# Patient Record
Sex: FEMALE | Race: WHITE | NOT HISPANIC OR LATINO
[De-identification: names, ages, dates, MRNs, and addresses within clinical notes are randomized per-mention and may not be internally consistent; named-entity substitution may affect disease eponyms.]

---

## 2017-03-10 ENCOUNTER — APPOINTMENT (OUTPATIENT)
Dept: OBGYN | Facility: CLINIC | Age: 42
End: 2017-03-10

## 2017-12-12 ENCOUNTER — APPOINTMENT (OUTPATIENT)
Dept: OBGYN | Facility: CLINIC | Age: 42
End: 2017-12-12

## 2018-02-23 ENCOUNTER — APPOINTMENT (OUTPATIENT)
Dept: OBGYN | Facility: CLINIC | Age: 43
End: 2018-02-23
Payer: COMMERCIAL

## 2018-02-23 ENCOUNTER — ASOB RESULT (OUTPATIENT)
Age: 43
End: 2018-02-23

## 2018-02-23 PROCEDURE — 76830 TRANSVAGINAL US NON-OB: CPT

## 2018-06-29 ENCOUNTER — APPOINTMENT (OUTPATIENT)
Dept: OBGYN | Facility: CLINIC | Age: 43
End: 2018-06-29
Payer: COMMERCIAL

## 2018-06-29 VITALS
DIASTOLIC BLOOD PRESSURE: 72 MMHG | HEIGHT: 64 IN | SYSTOLIC BLOOD PRESSURE: 110 MMHG | BODY MASS INDEX: 24.92 KG/M2 | OXYGEN SATURATION: 99 % | HEART RATE: 87 BPM | WEIGHT: 146 LBS

## 2018-06-29 DIAGNOSIS — Z12.31 ENCOUNTER FOR SCREENING MAMMOGRAM FOR MALIGNANT NEOPLASM OF BREAST: ICD-10-CM

## 2018-06-29 PROCEDURE — 99396 PREV VISIT EST AGE 40-64: CPT

## 2018-06-29 RX ORDER — DIAZEPAM 5 MG/1
5 TABLET ORAL
Qty: 30 | Refills: 0 | Status: COMPLETED | COMMUNITY
Start: 2018-04-05

## 2018-06-29 RX ORDER — METHYLPREDNISOLONE 4 MG/1
4 TABLET ORAL
Qty: 21 | Refills: 0 | Status: COMPLETED | COMMUNITY
Start: 2018-04-05

## 2018-06-29 RX ORDER — OXYCODONE AND ACETAMINOPHEN 5; 325 MG/1; MG/1
5-325 TABLET ORAL
Qty: 80 | Refills: 0 | Status: COMPLETED | COMMUNITY
Start: 2018-04-05

## 2018-06-29 RX ORDER — CEPHALEXIN 500 MG/1
500 CAPSULE ORAL
Qty: 40 | Refills: 0 | Status: COMPLETED | COMMUNITY
Start: 2018-04-05

## 2018-07-01 LAB — HPV HIGH+LOW RISK DNA PNL CVX: NOT DETECTED

## 2018-07-05 LAB — CYTOLOGY CVX/VAG DOC THIN PREP: NORMAL

## 2018-09-27 ENCOUNTER — ASOB RESULT (OUTPATIENT)
Age: 43
End: 2018-09-27

## 2018-09-27 ENCOUNTER — APPOINTMENT (OUTPATIENT)
Dept: OBGYN | Facility: CLINIC | Age: 43
End: 2018-09-27
Payer: COMMERCIAL

## 2018-09-27 PROCEDURE — 76830 TRANSVAGINAL US NON-OB: CPT

## 2018-10-10 ENCOUNTER — APPOINTMENT (OUTPATIENT)
Dept: ORTHOPEDIC SURGERY | Facility: CLINIC | Age: 43
End: 2018-10-10
Payer: COMMERCIAL

## 2018-10-10 PROCEDURE — 99203 OFFICE O/P NEW LOW 30 MIN: CPT

## 2018-11-08 ENCOUNTER — FORM ENCOUNTER (OUTPATIENT)
Age: 43
End: 2018-11-08

## 2018-11-09 ENCOUNTER — APPOINTMENT (OUTPATIENT)
Dept: MAMMOGRAPHY | Facility: IMAGING CENTER | Age: 43
End: 2018-11-09
Payer: COMMERCIAL

## 2018-11-09 ENCOUNTER — OUTPATIENT (OUTPATIENT)
Dept: OUTPATIENT SERVICES | Facility: HOSPITAL | Age: 43
LOS: 1 days | End: 2018-11-09
Payer: COMMERCIAL

## 2018-11-09 DIAGNOSIS — Z98.89 OTHER SPECIFIED POSTPROCEDURAL STATES: Chronic | ICD-10-CM

## 2018-11-09 DIAGNOSIS — M20.10 HALLUX VALGUS (ACQUIRED), UNSPECIFIED FOOT: Chronic | ICD-10-CM

## 2018-11-09 DIAGNOSIS — Z00.00 ENCOUNTER FOR GENERAL ADULT MEDICAL EXAMINATION WITHOUT ABNORMAL FINDINGS: ICD-10-CM

## 2018-11-09 PROCEDURE — 77063 BREAST TOMOSYNTHESIS BI: CPT

## 2018-11-09 PROCEDURE — 77067 SCR MAMMO BI INCL CAD: CPT | Mod: 26

## 2018-11-09 PROCEDURE — 77067 SCR MAMMO BI INCL CAD: CPT

## 2018-11-09 PROCEDURE — 77063 BREAST TOMOSYNTHESIS BI: CPT | Mod: 26

## 2019-03-08 ENCOUNTER — APPOINTMENT (OUTPATIENT)
Dept: OBGYN | Facility: CLINIC | Age: 44
End: 2019-03-08
Payer: COMMERCIAL

## 2019-03-08 ENCOUNTER — ASOB RESULT (OUTPATIENT)
Age: 44
End: 2019-03-08

## 2019-03-08 PROCEDURE — 76830 TRANSVAGINAL US NON-OB: CPT

## 2019-03-11 ENCOUNTER — OTHER (OUTPATIENT)
Age: 44
End: 2019-03-11

## 2019-06-09 ENCOUNTER — FORM ENCOUNTER (OUTPATIENT)
Age: 44
End: 2019-06-09

## 2019-06-10 ENCOUNTER — APPOINTMENT (OUTPATIENT)
Dept: ULTRASOUND IMAGING | Facility: CLINIC | Age: 44
End: 2019-06-10

## 2019-06-10 ENCOUNTER — OUTPATIENT (OUTPATIENT)
Dept: OUTPATIENT SERVICES | Facility: HOSPITAL | Age: 44
LOS: 1 days | End: 2019-06-10
Payer: COMMERCIAL

## 2019-06-10 DIAGNOSIS — M20.10 HALLUX VALGUS (ACQUIRED), UNSPECIFIED FOOT: Chronic | ICD-10-CM

## 2019-06-10 DIAGNOSIS — Z98.89 OTHER SPECIFIED POSTPROCEDURAL STATES: Chronic | ICD-10-CM

## 2019-06-10 DIAGNOSIS — Z00.8 ENCOUNTER FOR OTHER GENERAL EXAMINATION: ICD-10-CM

## 2019-06-10 PROCEDURE — 76856 US EXAM PELVIC COMPLETE: CPT | Mod: 26

## 2019-06-10 PROCEDURE — 76830 TRANSVAGINAL US NON-OB: CPT

## 2019-06-10 PROCEDURE — 76856 US EXAM PELVIC COMPLETE: CPT

## 2019-06-10 PROCEDURE — 76830 TRANSVAGINAL US NON-OB: CPT | Mod: 26

## 2019-06-12 ENCOUNTER — CLINICAL ADVICE (OUTPATIENT)
Age: 44
End: 2019-06-12

## 2019-07-25 ENCOUNTER — APPOINTMENT (OUTPATIENT)
Dept: OBGYN | Facility: CLINIC | Age: 44
End: 2019-07-25

## 2020-12-23 ENCOUNTER — APPOINTMENT (OUTPATIENT)
Dept: MAMMOGRAPHY | Facility: CLINIC | Age: 45
End: 2020-12-23
Payer: COMMERCIAL

## 2020-12-23 ENCOUNTER — APPOINTMENT (OUTPATIENT)
Dept: ULTRASOUND IMAGING | Facility: CLINIC | Age: 45
End: 2020-12-23
Payer: COMMERCIAL

## 2020-12-23 ENCOUNTER — OUTPATIENT (OUTPATIENT)
Dept: OUTPATIENT SERVICES | Facility: HOSPITAL | Age: 45
LOS: 1 days | End: 2020-12-23
Payer: COMMERCIAL

## 2020-12-23 DIAGNOSIS — Z00.8 ENCOUNTER FOR OTHER GENERAL EXAMINATION: ICD-10-CM

## 2020-12-23 DIAGNOSIS — Z98.89 OTHER SPECIFIED POSTPROCEDURAL STATES: Chronic | ICD-10-CM

## 2020-12-23 DIAGNOSIS — M20.10 HALLUX VALGUS (ACQUIRED), UNSPECIFIED FOOT: Chronic | ICD-10-CM

## 2020-12-23 PROCEDURE — 77066 DX MAMMO INCL CAD BI: CPT | Mod: 26

## 2020-12-23 PROCEDURE — 76642 ULTRASOUND BREAST LIMITED: CPT | Mod: 26,RT

## 2020-12-23 PROCEDURE — G0279: CPT | Mod: 26

## 2020-12-23 PROCEDURE — G0279: CPT

## 2020-12-23 PROCEDURE — 76642 ULTRASOUND BREAST LIMITED: CPT

## 2020-12-23 PROCEDURE — 77066 DX MAMMO INCL CAD BI: CPT

## 2020-12-23 PROCEDURE — 76641 ULTRASOUND BREAST COMPLETE: CPT

## 2021-02-15 DIAGNOSIS — Z87.42 PERSONAL HISTORY OF OTHER DISEASES OF THE FEMALE GENITAL TRACT: ICD-10-CM

## 2021-02-15 DIAGNOSIS — R10.2 PELVIC AND PERINEAL PAIN: ICD-10-CM

## 2021-02-17 ENCOUNTER — APPOINTMENT (OUTPATIENT)
Dept: OBGYN | Facility: CLINIC | Age: 46
End: 2021-02-17
Payer: COMMERCIAL

## 2021-02-17 VITALS
DIASTOLIC BLOOD PRESSURE: 78 MMHG | OXYGEN SATURATION: 99 % | HEART RATE: 89 BPM | HEIGHT: 64 IN | TEMPERATURE: 98 F | WEIGHT: 157 LBS | SYSTOLIC BLOOD PRESSURE: 118 MMHG | BODY MASS INDEX: 26.8 KG/M2

## 2021-02-17 PROCEDURE — 99396 PREV VISIT EST AGE 40-64: CPT

## 2021-02-17 PROCEDURE — 99072 ADDL SUPL MATRL&STAF TM PHE: CPT

## 2021-02-17 RX ORDER — SENNOSIDES 8.6 MG
8.6 TABLET ORAL
Qty: 30 | Refills: 0 | Status: DISCONTINUED | COMMUNITY
Start: 2018-04-05 | End: 2021-02-17

## 2021-02-17 RX ORDER — METHOCARBAMOL 500 MG/1
500 TABLET, FILM COATED ORAL
Qty: 60 | Refills: 0 | Status: DISCONTINUED | COMMUNITY
Start: 2018-05-31 | End: 2021-02-17

## 2021-02-18 LAB — HPV HIGH+LOW RISK DNA PNL CVX: NOT DETECTED

## 2021-02-19 ENCOUNTER — APPOINTMENT (OUTPATIENT)
Dept: OBGYN | Facility: CLINIC | Age: 46
End: 2021-02-19
Payer: COMMERCIAL

## 2021-02-21 LAB — CYTOLOGY CVX/VAG DOC THIN PREP: NORMAL

## 2021-02-23 ENCOUNTER — ASOB RESULT (OUTPATIENT)
Age: 46
End: 2021-02-23

## 2021-02-23 ENCOUNTER — APPOINTMENT (OUTPATIENT)
Dept: OBGYN | Facility: CLINIC | Age: 46
End: 2021-02-23
Payer: COMMERCIAL

## 2021-02-23 ENCOUNTER — NON-APPOINTMENT (OUTPATIENT)
Age: 46
End: 2021-02-23

## 2021-02-23 PROCEDURE — 76830 TRANSVAGINAL US NON-OB: CPT

## 2021-02-23 PROCEDURE — 99072 ADDL SUPL MATRL&STAF TM PHE: CPT

## 2021-03-11 ENCOUNTER — OUTPATIENT (OUTPATIENT)
Dept: OUTPATIENT SERVICES | Facility: HOSPITAL | Age: 46
LOS: 1 days | End: 2021-03-11

## 2021-03-11 VITALS
WEIGHT: 153 LBS | TEMPERATURE: 98 F | DIASTOLIC BLOOD PRESSURE: 80 MMHG | RESPIRATION RATE: 16 BRPM | HEART RATE: 72 BPM | HEIGHT: 64 IN | OXYGEN SATURATION: 99 % | SYSTOLIC BLOOD PRESSURE: 120 MMHG

## 2021-03-11 DIAGNOSIS — N83.209 UNSPECIFIED OVARIAN CYST, UNSPECIFIED SIDE: ICD-10-CM

## 2021-03-11 DIAGNOSIS — Z98.890 OTHER SPECIFIED POSTPROCEDURAL STATES: Chronic | ICD-10-CM

## 2021-03-11 DIAGNOSIS — T78.40XA ALLERGY, UNSPECIFIED, INITIAL ENCOUNTER: ICD-10-CM

## 2021-03-11 DIAGNOSIS — K21.9 GASTRO-ESOPHAGEAL REFLUX DISEASE WITHOUT ESOPHAGITIS: ICD-10-CM

## 2021-03-11 DIAGNOSIS — Z98.89 OTHER SPECIFIED POSTPROCEDURAL STATES: Chronic | ICD-10-CM

## 2021-03-11 DIAGNOSIS — M20.10 HALLUX VALGUS (ACQUIRED), UNSPECIFIED FOOT: Chronic | ICD-10-CM

## 2021-03-11 LAB
BLD GP AB SCN SERPL QL: NEGATIVE — SIGNIFICANT CHANGE UP
HCG UR QL: NEGATIVE — SIGNIFICANT CHANGE UP
HCT VFR BLD CALC: 42.6 % — SIGNIFICANT CHANGE UP (ref 34.5–45)
HGB BLD-MCNC: 13.7 G/DL — SIGNIFICANT CHANGE UP (ref 11.5–15.5)
MCHC RBC-ENTMCNC: 29.6 PG — SIGNIFICANT CHANGE UP (ref 27–34)
MCHC RBC-ENTMCNC: 32.2 GM/DL — SIGNIFICANT CHANGE UP (ref 32–36)
MCV RBC AUTO: 92 FL — SIGNIFICANT CHANGE UP (ref 80–100)
NRBC # BLD: 0 /100 WBCS — SIGNIFICANT CHANGE UP
NRBC # FLD: 0 K/UL — SIGNIFICANT CHANGE UP
PLATELET # BLD AUTO: 329 K/UL — SIGNIFICANT CHANGE UP (ref 150–400)
RBC # BLD: 4.63 M/UL — SIGNIFICANT CHANGE UP (ref 3.8–5.2)
RBC # FLD: 12.7 % — SIGNIFICANT CHANGE UP (ref 10.3–14.5)
RH IG SCN BLD-IMP: POSITIVE — SIGNIFICANT CHANGE UP
WBC # BLD: 4 K/UL — SIGNIFICANT CHANGE UP (ref 3.8–10.5)
WBC # FLD AUTO: 4 K/UL — SIGNIFICANT CHANGE UP (ref 3.8–10.5)

## 2021-03-11 RX ORDER — SODIUM CHLORIDE 9 MG/ML
1000 INJECTION, SOLUTION INTRAVENOUS
Refills: 0 | Status: DISCONTINUED | OUTPATIENT
Start: 2021-03-23 | End: 2021-04-06

## 2021-03-11 NOTE — H&P PST ADULT - NEGATIVE ENMT SYMPTOMS
no hearing difficulty/no ear pain/no tinnitus/no vertigo/no nasal congestion/no nasal obstruction/no nose bleeds/no abnormal taste sensation/no gum bleeding/no throat pain/no dysphagia

## 2021-03-11 NOTE — H&P PST ADULT - NSICDXPASTSURGICALHX_GEN_ALL_CORE_FT
PAST SURGICAL HISTORY:  Bunion repair - left foot - 2012    H/O elbow surgery right elbow - March 2014    S/P lumbar laminectomy 2018    S/P removal of ovarian cyst 2014, 2016

## 2021-03-11 NOTE — H&P PST ADULT - NSICDXPROBLEM_GEN_ALL_CORE_FT
PROBLEM DIAGNOSES  Problem: Cyst of ovary  Assessment and Plan: Patient tentatively scheduled for laparoscopic left salpingo oophorectomy on 3/23/21.  Pre-op instructions provided. Pt given verbal and written instructions with teach back on chlorhexidine shampoo . Pt verbalized understanding with return demonstration.   Covid testing scheduled prior to surgery as per patient.     Problem: Allergy  Assessment and Plan: Patient allergic to Latex. OR booking notified.     Problem: GERD (gastroesophageal reflux disease)  Assessment and Plan: Patient instructed to take omeprazole with a sip of water on the morning of procedure.

## 2021-03-11 NOTE — H&P PST ADULT - NEGATIVE OPHTHALMOLOGIC SYMPTOMS
no diplopia/no photophobia/no lacrimation L/no lacrimation R/no blurred vision L/no blurred vision R/no discharge L/no pain R

## 2021-03-11 NOTE — H&P PST ADULT - NEGATIVE NEUROLOGICAL SYMPTOMS
no transient paralysis/no weakness/no paresthesias/no generalized seizures/no vertigo/no difficulty walking

## 2021-03-11 NOTE — H&P PST ADULT - GENERAL COMMENTS
Denies recent travel or hx of COVID-19 infection Denies recent travel ,  hx of COVID-19 infection in October 2020

## 2021-03-11 NOTE — H&P PST ADULT - ATTENDING COMMENTS
recurrent left ovarian cyst --2 previous ov cystectomy plan for laparoscopic LSO of recurrent mucinous cystadenoma. rev R/B/A,side effects ,compls incldg but not limited to poss laparotomy,infection,bleeding,injury to bowel,b;adder ,vascular,ureter,fistula,reop,bowel obstruction,wound compl,VTE,CNS/CVS/pulm compl,postop compl affecting QOOL. Ample time for questions provided

## 2021-03-11 NOTE — H&P PST ADULT - VASCULAR
February 19, 2020      Matt Hernandez MD  [address]  [place] [address]           [facility]ly [facility], later [facility]  [address] [place] [id number]  Phone: [phone]  Fax: [phone]          Patient: Ted Arizmendi   MR Number: [id number]   YOB: 1971   Date of Visit: 2/19/2020       Dear Dr. Matt Hernandez:    Thank you for referring Ted Arizmendi to me for evaluation. Attached you will find relevant portions of my assessment and plan of care.    If you have questions, please do not hesitate to call me. I look forward to following Ted Arizmendi along with you.    Sincerely,    Milton Lyman IV, MD    Enclosure  CC:  No Recipients    If you would like to receive this communication electronically, please contact [email or web address] or [phone] to request more information on [employer] access.    For providers and/or their staff who would like to refer a patient to [facility], please contact us through our one-stop-shop provider referral line, [facility], at [phone].    If you feel you have received this communication in error or would no longer like to receive these types of communications, please e-mail [email or web address]          Equal and normal pulses (carotid, femoral, dorsalis pedis) detailed exam

## 2021-03-11 NOTE — H&P PST ADULT - NSICDXFAMILYHX_GEN_ALL_CORE_FT
FAMILY HISTORY:  Mother  Still living? Yes, Estimated age: 61-70  Family history of hypertension, Age at diagnosis: Age Unknown    Sibling  Still living? Yes, Estimated age: 41-50  Family history of diabetes mellitus (DM), Age at diagnosis: Age Unknown

## 2021-03-11 NOTE — H&P PST ADULT - HISTORY OF PRESENT ILLNESS
46 yrs old female with h/o left ovarian cyst presents for preop eval to have laparoscopic left ovarian cystectomy on 11/3/14. pt stated the she had abdominal pain 5 wks ago and so went to Gyn and had sonogram that showed ovarian cyst. Cystectomy was recommended 46 yrs old female with h/o left ovarian cyst presents for preop eval to have laparoscopic left salpingo oophorectomy. Patient  stated the she had abdominal pain 5 wks ago and so went to Gyn and had sonogram that showed ovarian cyst.  46 year old female presents to Presurgical testing with diagnosis of unspecified ovarian cyst scheduled for laparoscopic left salpingo oophorectomy. Patient with h/o ovarian cyst had surgery  previously. Recently with c/o abdominal pain US revealed left ovarian cysts

## 2021-03-16 ENCOUNTER — TRANSCRIPTION ENCOUNTER (OUTPATIENT)
Age: 46
End: 2021-03-16

## 2021-03-20 ENCOUNTER — APPOINTMENT (OUTPATIENT)
Dept: DISASTER EMERGENCY | Facility: CLINIC | Age: 46
End: 2021-03-20

## 2021-03-21 LAB — SARS-COV-2 N GENE NPH QL NAA+PROBE: NOT DETECTED

## 2021-03-22 ENCOUNTER — TRANSCRIPTION ENCOUNTER (OUTPATIENT)
Age: 46
End: 2021-03-22

## 2021-03-22 NOTE — ASU PATIENT PROFILE, ADULT - PSH
Bunion  repair - left foot - 2012  H/O elbow surgery  right elbow - March 2014  S/P lumbar laminectomy  2018  S/P removal of ovarian cyst  2014, 2016

## 2021-03-23 ENCOUNTER — APPOINTMENT (OUTPATIENT)
Dept: OBGYN | Facility: HOSPITAL | Age: 46
End: 2021-03-23

## 2021-03-23 ENCOUNTER — OUTPATIENT (OUTPATIENT)
Dept: OUTPATIENT SERVICES | Facility: HOSPITAL | Age: 46
LOS: 1 days | Discharge: HOME | End: 2021-03-23
Payer: COMMERCIAL

## 2021-03-23 ENCOUNTER — RESULT REVIEW (OUTPATIENT)
Age: 46
End: 2021-03-23

## 2021-03-23 VITALS
SYSTOLIC BLOOD PRESSURE: 130 MMHG | DIASTOLIC BLOOD PRESSURE: 86 MMHG | RESPIRATION RATE: 12 BRPM | OXYGEN SATURATION: 97 % | HEIGHT: 64 IN | TEMPERATURE: 98 F | WEIGHT: 149.91 LBS | HEART RATE: 69 BPM

## 2021-03-23 VITALS
DIASTOLIC BLOOD PRESSURE: 85 MMHG | OXYGEN SATURATION: 100 % | SYSTOLIC BLOOD PRESSURE: 137 MMHG | HEART RATE: 63 BPM | RESPIRATION RATE: 12 BRPM

## 2021-03-23 DIAGNOSIS — Z98.890 OTHER SPECIFIED POSTPROCEDURAL STATES: Chronic | ICD-10-CM

## 2021-03-23 DIAGNOSIS — M20.10 HALLUX VALGUS (ACQUIRED), UNSPECIFIED FOOT: Chronic | ICD-10-CM

## 2021-03-23 DIAGNOSIS — Z98.89 OTHER SPECIFIED POSTPROCEDURAL STATES: Chronic | ICD-10-CM

## 2021-03-23 DIAGNOSIS — N83.209 UNSPECIFIED OVARIAN CYST, UNSPECIFIED SIDE: ICD-10-CM

## 2021-03-23 LAB — HCG UR QL: NEGATIVE — SIGNIFICANT CHANGE UP

## 2021-03-23 PROCEDURE — 88112 CYTOPATH CELL ENHANCE TECH: CPT | Mod: 26

## 2021-03-23 PROCEDURE — 88302 TISSUE EXAM BY PATHOLOGIST: CPT | Mod: 26

## 2021-03-23 PROCEDURE — 88305 TISSUE EXAM BY PATHOLOGIST: CPT | Mod: 26,59

## 2021-03-23 PROCEDURE — 58661 LAPAROSCOPY REMOVE ADNEXA: CPT | Mod: GC

## 2021-03-23 PROCEDURE — 88305 TISSUE EXAM BY PATHOLOGIST: CPT | Mod: 26

## 2021-03-23 RX ORDER — SODIUM CHLORIDE 9 MG/ML
1000 INJECTION, SOLUTION INTRAVENOUS
Refills: 0 | Status: DISCONTINUED | OUTPATIENT
Start: 2021-03-23 | End: 2021-04-06

## 2021-03-23 RX ORDER — IBUPROFEN 200 MG
1 TABLET ORAL
Qty: 30 | Refills: 0
Start: 2021-03-23

## 2021-03-23 RX ORDER — ACETAMINOPHEN 500 MG
975 TABLET ORAL EVERY 6 HOURS
Refills: 0 | Status: DISCONTINUED | OUTPATIENT
Start: 2021-03-23 | End: 2021-04-06

## 2021-03-23 RX ORDER — ONDANSETRON 8 MG/1
4 TABLET, FILM COATED ORAL ONCE
Refills: 0 | Status: DISCONTINUED | OUTPATIENT
Start: 2021-03-23 | End: 2021-04-06

## 2021-03-23 RX ORDER — OMEPRAZOLE 10 MG/1
1 CAPSULE, DELAYED RELEASE ORAL
Qty: 0 | Refills: 0 | DISCHARGE

## 2021-03-23 RX ORDER — METOCLOPRAMIDE HCL 10 MG
10 TABLET ORAL ONCE
Refills: 0 | Status: DISCONTINUED | OUTPATIENT
Start: 2021-03-23 | End: 2021-04-06

## 2021-03-23 RX ORDER — FENTANYL CITRATE 50 UG/ML
50 INJECTION INTRAVENOUS
Refills: 0 | Status: DISCONTINUED | OUTPATIENT
Start: 2021-03-23 | End: 2021-03-23

## 2021-03-23 RX ORDER — IBUPROFEN 200 MG
600 TABLET ORAL EVERY 6 HOURS
Refills: 0 | Status: DISCONTINUED | OUTPATIENT
Start: 2021-03-23 | End: 2021-04-06

## 2021-03-23 RX ORDER — ACETAMINOPHEN 500 MG
3 TABLET ORAL
Qty: 30 | Refills: 0
Start: 2021-03-23

## 2021-03-23 RX ORDER — OXYCODONE HYDROCHLORIDE 5 MG/1
5 TABLET ORAL ONCE
Refills: 0 | Status: DISCONTINUED | OUTPATIENT
Start: 2021-03-23 | End: 2021-03-23

## 2021-03-23 RX ORDER — OXYCODONE HYDROCHLORIDE 5 MG/1
1 TABLET ORAL
Qty: 6 | Refills: 0
Start: 2021-03-23

## 2021-03-23 RX ADMIN — SODIUM CHLORIDE 30 MILLILITER(S): 9 INJECTION, SOLUTION INTRAVENOUS at 10:06

## 2021-03-23 RX ADMIN — OXYCODONE HYDROCHLORIDE 5 MILLIGRAM(S): 5 TABLET ORAL at 13:09

## 2021-03-23 NOTE — BRIEF OPERATIVE NOTE - OPERATION/FINDINGS
Mobile 8-week size uterus on EUA. 10cm left adnexal cyst. No gross disease or carcinomatosis noted laparoscopic survey of the abdomen and pelvis. 10cm simple appearing left ovarian cyst. Intraoperative rupture of cyst which revealed clear simple fluid. Left ovary adhered to left pelvic side wall, carefully dissected off. Left ureter identified and noted to be below operative field. Grossly normal right tube and ovary. Mariela applied over operative sites to ensure hemostasis.

## 2021-03-23 NOTE — ASU DISCHARGE PLAN (ADULT/PEDIATRIC) - ASU DC SPECIAL INSTRUCTIONSFT
Regular diet as tolerated, regular activity as tolerated. Nothing per vagina: no intercourse, tampons or douching.  Call your provider if you experience fevers, chills, worsening abdominal pain, inability to urinate or worsening vaginal bleeding. Please follow up with your doctor as scheduled for your postoperative check.

## 2021-03-23 NOTE — ASU DISCHARGE PLAN (ADULT/PEDIATRIC) - CARE PROVIDER_API CALL
Cristal Hedrick)  Obstetrics and Gynecology  [address] [place] [address], [address]  [place], [place] [id number]  Phone: [phone]  Fax: [phone]  Follow Up Time:

## 2021-03-23 NOTE — ASU DISCHARGE PLAN (ADULT/PEDIATRIC) - ACTIVITY LEVEL
for 2 weeks/Nothing per rectum/Nothing per vagina/No tub baths/No douching/No tampons/No intercourse

## 2021-03-23 NOTE — ASU DISCHARGE PLAN (ADULT/PEDIATRIC) - NURSING INSTRUCTIONS
You were given intravenous TYLENOL for pain management at 11:30 am. Please DO NOT take any products containing TYLENOL or ACETAMINOPHEN, such as VICODIN, PERCOCET, EXCEDRIN, and any over-the-counter cold medication for the next 6 hours until 5:30 pm. DO NOT TAKE MORE THAN 3000 MG OF TYLENOL in a 24 hour period.     You were given intravenous TORADOL for pain management at 12:00 pm. Please DO NOT take any products containing MOTRIN, ADVIL, or IBUPROFEN until 6:00 pm.

## 2021-03-29 ENCOUNTER — TRANSCRIPTION ENCOUNTER (OUTPATIENT)
Age: 46
End: 2021-03-29

## 2021-03-29 DIAGNOSIS — L03.90 CELLULITIS, UNSPECIFIED: ICD-10-CM

## 2021-03-29 DIAGNOSIS — N76.0 ACUTE VAGINITIS: ICD-10-CM

## 2021-04-01 LAB — NON-GYNECOLOGICAL CYTOLOGY STUDY: SIGNIFICANT CHANGE UP

## 2021-04-08 ENCOUNTER — APPOINTMENT (OUTPATIENT)
Dept: OBGYN | Facility: CLINIC | Age: 46
End: 2021-04-08
Payer: COMMERCIAL

## 2021-04-08 VITALS
BODY MASS INDEX: 25.78 KG/M2 | WEIGHT: 151 LBS | HEIGHT: 64 IN | OXYGEN SATURATION: 99 % | HEART RATE: 83 BPM | SYSTOLIC BLOOD PRESSURE: 112 MMHG | DIASTOLIC BLOOD PRESSURE: 74 MMHG | TEMPERATURE: 97.7 F

## 2021-04-08 PROCEDURE — 99024 POSTOP FOLLOW-UP VISIT: CPT

## 2021-04-08 RX ORDER — CEPHALEXIN 500 MG/1
500 CAPSULE ORAL
Qty: 14 | Refills: 0 | Status: DISCONTINUED | COMMUNITY
Start: 2021-03-29 | End: 2021-04-08

## 2021-04-08 RX ORDER — FLUCONAZOLE 150 MG/1
150 TABLET ORAL
Qty: 2 | Refills: 3 | Status: DISCONTINUED | COMMUNITY
Start: 2021-03-29 | End: 2021-04-08

## 2021-04-08 NOTE — HISTORY OF PRESENT ILLNESS
[Pain is well-controlled] : pain is well-controlled [Clean/Dry/Intact] : clean, dry and intact [None] : no vaginal bleeding [Normal] : normal [Fever] : no fever [Chills] : no chills [Nausea] : no nausea [Vomiting] : no vomiting [Erythema] : not erythematous

## 2021-04-20 ENCOUNTER — TRANSCRIPTION ENCOUNTER (OUTPATIENT)
Age: 46
End: 2021-04-20

## 2021-04-20 LAB — SURGICAL PATHOLOGY STUDY: SIGNIFICANT CHANGE UP

## 2021-04-23 ENCOUNTER — NON-APPOINTMENT (OUTPATIENT)
Age: 46
End: 2021-04-23

## 2021-04-23 PROBLEM — U07.1 COVID-19: Chronic | Status: ACTIVE | Noted: 2021-03-11

## 2021-04-23 PROBLEM — K21.9 GASTRO-ESOPHAGEAL REFLUX DISEASE WITHOUT ESOPHAGITIS: Chronic | Status: ACTIVE | Noted: 2021-03-11

## 2021-04-25 ENCOUNTER — TRANSCRIPTION ENCOUNTER (OUTPATIENT)
Age: 46
End: 2021-04-25

## 2021-04-26 PROBLEM — Z01.818 PREOP TESTING: Status: RESOLVED | Noted: 2021-03-19 | Resolved: 2021-04-26

## 2021-04-26 PROBLEM — N83.202 CYST OF LEFT OVARY: Status: RESOLVED | Noted: 2021-02-17 | Resolved: 2021-04-26

## 2021-04-28 ENCOUNTER — APPOINTMENT (OUTPATIENT)
Dept: OBGYN | Facility: CLINIC | Age: 46
End: 2021-04-28
Payer: COMMERCIAL

## 2021-04-28 VITALS
BODY MASS INDEX: 24.75 KG/M2 | SYSTOLIC BLOOD PRESSURE: 147 MMHG | DIASTOLIC BLOOD PRESSURE: 85 MMHG | HEART RATE: 88 BPM | WEIGHT: 145 LBS | HEIGHT: 64 IN

## 2021-04-28 DIAGNOSIS — Z01.818 ENCOUNTER FOR OTHER PREPROCEDURAL EXAMINATION: ICD-10-CM

## 2021-04-28 DIAGNOSIS — N83.202 UNSPECIFIED OVARIAN CYST, LEFT SIDE: ICD-10-CM

## 2021-04-28 PROCEDURE — 99024 POSTOP FOLLOW-UP VISIT: CPT

## 2021-04-28 NOTE — HISTORY OF PRESENT ILLNESS
[Pain is well-controlled] : pain is well-controlled [None] : no vaginal bleeding [Fever] : no fever [Chills] : no chills [Nausea] : no nausea [Vomiting] : no vomiting [Erythema] : not erythematous [Pathology reviewed] : pathology reviewed [de-identified] : min separation upper port-clear edges,no infevtion

## 2021-04-28 NOTE — REASON FOR VISIT
[de-identified] : 3/23/21 Laparoscopic LSO.c/o poss wound infection--did not complete abx and wound separation--min at upper port--no fould drainage/min serous as per pt

## 2021-05-07 ENCOUNTER — APPOINTMENT (OUTPATIENT)
Dept: OBGYN | Facility: CLINIC | Age: 46
End: 2021-05-07
Payer: COMMERCIAL

## 2021-05-07 VITALS
DIASTOLIC BLOOD PRESSURE: 87 MMHG | TEMPERATURE: 97.6 F | WEIGHT: 145 LBS | BODY MASS INDEX: 24.75 KG/M2 | HEIGHT: 64 IN | HEART RATE: 65 BPM | SYSTOLIC BLOOD PRESSURE: 122 MMHG

## 2021-05-07 DIAGNOSIS — Z09 ENCOUNTER FOR FOLLOW-UP EXAMINATION AFTER COMPLETED TREATMENT FOR CONDITIONS OTHER THAN MALIGNANT NEOPLASM: ICD-10-CM

## 2021-05-07 PROCEDURE — 99024 POSTOP FOLLOW-UP VISIT: CPT

## 2021-05-07 NOTE — REASON FOR VISIT
[de-identified] : 3/23/21 Laparoscopic LSO.c/o poss wound infection--did not complete abx and wound separation--min at upper port--now healed

## 2021-05-07 NOTE — HISTORY OF PRESENT ILLNESS
[Pain is well-controlled] : pain is well-controlled [None] : no vaginal bleeding [Pathology reviewed] : pathology reviewed [Fever] : no fever [Chills] : no chills [Nausea] : no nausea [Vomiting] : no vomiting [Clean/Dry/Intact] : clean, dry and intact [Erythema] : not erythematous

## 2021-12-14 NOTE — H&P PST ADULT - BP NONINVASIVE SYSTOLIC (MM HG)
FAMILY HISTORY:  Sibling  Still living? Unknown  Family history of lung cancer, Age at diagnosis: Age Unknown     89

## 2022-10-21 ENCOUNTER — NON-APPOINTMENT (OUTPATIENT)
Age: 47
End: 2022-10-21

## 2023-01-22 ENCOUNTER — NON-APPOINTMENT (OUTPATIENT)
Age: 48
End: 2023-01-22

## 2023-04-14 NOTE — PHYSICAL EXAM
[Appropriately responsive] : appropriately responsive [Alert] : alert [No Acute Distress] : no acute distress [No Lymphadenopathy] : no lymphadenopathy [Soft] : soft [Non-tender] : non-tender [Non-distended] : non-distended [No HSM] : No HSM [No Lesions] : no lesions [No Mass] : no mass [Oriented x3] : oriented x3 [Examination Of The Breasts] : a normal appearance [No Masses] : no breast masses were palpable [Labia Majora] : normal [Labia Minora] : normal [Normal] : normal [Uterine Adnexae] : absent

## 2023-04-21 ENCOUNTER — APPOINTMENT (OUTPATIENT)
Dept: OBGYN | Facility: CLINIC | Age: 48
End: 2023-04-21
Payer: COMMERCIAL

## 2023-04-21 VITALS — DIASTOLIC BLOOD PRESSURE: 86 MMHG | HEART RATE: 65 BPM | SYSTOLIC BLOOD PRESSURE: 136 MMHG

## 2023-04-21 VITALS
WEIGHT: 145 LBS | BODY MASS INDEX: 24.75 KG/M2 | SYSTOLIC BLOOD PRESSURE: 144 MMHG | HEIGHT: 64 IN | HEART RATE: 64 BPM | DIASTOLIC BLOOD PRESSURE: 91 MMHG

## 2023-04-21 DIAGNOSIS — A60.09 HERPESVIRAL INFECTION OF OTHER UROGENITAL TRACT: ICD-10-CM

## 2023-04-21 PROCEDURE — 99396 PREV VISIT EST AGE 40-64: CPT

## 2023-04-21 PROCEDURE — 99072 ADDL SUPL MATRL&STAF TM PHE: CPT

## 2023-04-21 RX ORDER — OMEPRAZOLE 20 MG/1
20 CAPSULE, DELAYED RELEASE ORAL
Refills: 0 | Status: ACTIVE | COMMUNITY

## 2024-05-16 ENCOUNTER — APPOINTMENT (OUTPATIENT)
Dept: OBGYN | Facility: CLINIC | Age: 49
End: 2024-05-16
Payer: COMMERCIAL

## 2024-05-16 VITALS
SYSTOLIC BLOOD PRESSURE: 156 MMHG | DIASTOLIC BLOOD PRESSURE: 86 MMHG | HEIGHT: 64 IN | WEIGHT: 150 LBS | HEART RATE: 67 BPM | BODY MASS INDEX: 25.61 KG/M2

## 2024-05-16 DIAGNOSIS — Z01.419 ENCOUNTER FOR GYNECOLOGICAL EXAMINATION (GENERAL) (ROUTINE) W/OUT ABNORMAL FINDINGS: ICD-10-CM

## 2024-05-16 PROCEDURE — 96127 BRIEF EMOTIONAL/BEHAV ASSMT: CPT

## 2024-05-16 PROCEDURE — 99396 PREV VISIT EST AGE 40-64: CPT

## 2024-05-16 NOTE — REVIEW OF SYSTEMS
OCTAVIA  I: SW called [facility] to get patient's baseline mentation. Staff stated that patient's cog status is that of a 10 year old. Staff consistently do cares with 2 staff due to patient's behavior. Patient is on a regular diet at baseline and is an Ax1 for transfers. TCU is aware patient maybe d/cing over the next couple days. SW will arrange stretcher at d/c due to patient's cognition and lethargy. SW will arrange transport on day of d/c.    P: SW will continue to follow and assist as needed.    Janene Medina, CHERI   *[phone]     [Negative] : Psychiatric

## 2024-06-06 LAB
CYTOLOGY CVX/VAG DOC THIN PREP: NORMAL
HPV HIGH+LOW RISK DNA PNL CVX: NOT DETECTED

## 2024-06-06 NOTE — PHYSICAL EXAM
[Chaperone Present] : A chaperone was present in the examining room during all aspects of the physical examination [Appropriately responsive] : appropriately responsive [Alert] : alert [No Acute Distress] : no acute distress [No Lymphadenopathy] : no lymphadenopathy [Soft] : soft [Non-tender] : non-tender [Non-distended] : non-distended [No HSM] : No HSM [No Lesions] : no lesions [No Mass] : no mass [Examination Of The Breasts] : a normal appearance [No Masses] : no breast masses were palpable [Labia Majora] : normal [Labia Minora] : normal [Normal] : normal [Uterine Adnexae] : normal [13823] : A chaperone was present during the pelvic exam. [FreeTextEntry2] : MARIO ALBERTO Dubose

## 2024-06-06 NOTE — HISTORY OF PRESENT ILLNESS
[Patient reported mammogram was normal] : Patient reported mammogram was normal [Patient reported PAP Smear was normal] : Patient reported PAP Smear was normal [No] : Patient does not have concerns regarding sex [Currently Active] : currently active [Men] : men [Vaginal] : vaginal [FreeTextEntry1] : 49 year old P1  presents as new patient for annual gyn visit and to establish care. She reports LMP 04/28/24, reg menses, and offers no complaints. Denies vb, abnormal discharge. H/o ovarian cystectomy    [Y] : Patient is sexually active [Mammogramdate] : 11/18 [PapSmeardate] : 02/21 [ColonoscopyDate] : 2023

## 2024-06-06 NOTE — PLAN
[FreeTextEntry1] : 49 year old female presents as new patient for annual gyn visit and to establish care.  Pap/HPV done today Referral for mammo/sono is given RTO annually

## 2024-06-06 NOTE — SIGNATURES
[TextEntry] : This note was written by Michela Pisano on 05/16/2024 actively solely SONIDO Gabriel M.D 05/16/2024. All medical record entries made by this scribe were at my  SONIDO Gabriel M.D, M.D  direction and personally dictated by me on 05/16/2024. I have personally reviewed my chart and agree that the record reflects my personal performance of the history, physical exam, assessment, and plan.

## 2024-07-24 ENCOUNTER — NON-APPOINTMENT (OUTPATIENT)
Age: 49
End: 2024-07-24

## 2024-07-26 ENCOUNTER — APPOINTMENT (OUTPATIENT)
Dept: ORTHOPEDIC SURGERY | Facility: CLINIC | Age: 49
End: 2024-07-26
Payer: COMMERCIAL

## 2024-07-26 VITALS — BODY MASS INDEX: 25.61 KG/M2 | WEIGHT: 150 LBS | HEIGHT: 64 IN

## 2024-07-26 DIAGNOSIS — M25.851 OTHER SPECIFIED JOINT DISORDERS, RIGHT HIP: ICD-10-CM

## 2024-07-26 DIAGNOSIS — M25.551 PAIN IN RIGHT HIP: ICD-10-CM

## 2024-07-26 PROCEDURE — 99203 OFFICE O/P NEW LOW 30 MIN: CPT

## 2024-07-26 PROCEDURE — 73502 X-RAY EXAM HIP UNI 2-3 VIEWS: CPT | Mod: RT

## 2024-07-26 NOTE — DISCUSSION/SUMMARY
[de-identified] : Right femoral acetabular impingement and labral tear.  Will obtain a new MRI to further assess we discussed the possibility of arthroscopy and labral repair and femoroplasty based on the MRI findings she will follow-up after MRI.  All questions answered

## 2024-07-26 NOTE — HISTORY OF PRESENT ILLNESS
[de-identified] : 49 y.o F presents to the office complaining of 2 years of R sided worsening hip pain. Denies traumatic CYNTHIA. States she felt a pop in her hip while running a 5k about 2 years ago. Has been dealing with worsening pain ever since. Denies previous history of injury to either hip. Denies mechanical symptoms. Denies numbness/tingling in involved area. Complains of pain with ambulation, extended activities. Here today to discuss treatment options for involved injury. She presents with an MRI from 2 years ago from [facility]. She has tried physical therapy for this injury.  The patient's past medical history, past surgical history, medications, allergies, and social history were reviewed by me today with the patient and documented accordingly. In addition, the patient's family history, which is noncontributory to this visit, was also reviewed.

## 2024-07-26 NOTE — PHYSICAL EXAM
[de-identified] : General Exam  Well developed, well nourished  No apparent distress  Oriented to person, place, and time  Mood: Normal  Affect: Normal  Balance and coordination: Normal  Gait: Normal  Right hip exam   Skin: Clean/dry and intact Inspection: No obvious deformity, no swelling, no ecchymosis.  Tenderness: no tenderness over greater trochanter/glut medius insertion. No tenderness pubic symphysis, pubic tubercle, hip flexors. No ttp ischial tuberosity or buttock. No ttp over the ASIS/Illiac crest.  ROM: 0-120. Internal rotation 30 external rotation 70 Painful ROM: None  Additional tests: No pain with circumduction pos impingement test at 90 mildly positive impingement test at 60 negative [facility] Strength: 5/5 hip flexion/ADD/ABD/Q/H/TA/GS/EHL  Neuro: Sensation in tact to light touch throughout in dp/sp/tib/neri/saph distributions  Pulses: 2+ DP/PT pulses  [de-identified] : The following radiographs were ordered and read by me during this patients visit. I reviewed each radiograph in detail with the patient and discussed the findings as highlighted below.   AP pelvis AP lateral hip obtained today there is decreased femoral head neck offset hip joint spaces are well-maintained there is an impaction cyst at the femoral head neck junction  MRI right hip from 2022 with a partial thickness tear of the anterior superior labrum decreased femoral head neck offset

## 2024-08-05 ENCOUNTER — APPOINTMENT (OUTPATIENT)
Dept: MRI IMAGING | Facility: CLINIC | Age: 49
End: 2024-08-05

## 2024-08-05 ENCOUNTER — OUTPATIENT (OUTPATIENT)
Dept: OUTPATIENT SERVICES | Facility: HOSPITAL | Age: 49
LOS: 1 days | End: 2024-08-05
Payer: COMMERCIAL

## 2024-08-05 DIAGNOSIS — Z98.89 UNDEFINED: Chronic | ICD-10-CM

## 2024-08-05 DIAGNOSIS — M25.551 PAIN IN RIGHT HIP: ICD-10-CM

## 2024-08-05 DIAGNOSIS — Z98.890 OTHER SPECIFIED POSTPROCEDURAL STATES: Chronic | ICD-10-CM

## 2024-08-05 PROCEDURE — 73721 MRI JNT OF LWR EXTRE W/O DYE: CPT

## 2024-08-05 PROCEDURE — 73721 MRI JNT OF LWR EXTRE W/O DYE: CPT | Mod: 26,RT

## 2024-08-08 ENCOUNTER — NON-APPOINTMENT (OUTPATIENT)
Age: 49
End: 2024-08-08

## 2024-08-23 ENCOUNTER — APPOINTMENT (OUTPATIENT)
Dept: ORTHOPEDIC SURGERY | Facility: CLINIC | Age: 49
End: 2024-08-23
Payer: COMMERCIAL

## 2024-08-23 VITALS — BODY MASS INDEX: 25.27 KG/M2 | WEIGHT: 148 LBS | HEIGHT: 64 IN

## 2024-08-23 DIAGNOSIS — S73.191A OTHER SPRAIN OF RIGHT HIP, INITIAL ENCOUNTER: ICD-10-CM

## 2024-08-23 DIAGNOSIS — M25.851 OTHER SPECIFIED JOINT DISORDERS, RIGHT HIP: ICD-10-CM

## 2024-08-23 PROCEDURE — 99214 OFFICE O/P EST MOD 30 MIN: CPT

## 2024-08-23 NOTE — DISCUSSION/SUMMARY
[de-identified] : Right femoral acetabular impingement and labral tear.  We discussed treatment options at length both surgical and nonsurgical.  We discussed nonsurgical care with activity modification physical therapy medications injections.  We discussed surgical treatment.  Right hip arthroscopy labral repair femoroplasty.  We discussed the surgery postoperative protocol restrictions at length.  Risk benefits alternatives discussed including not limited to risk such as bleeding infection nerve and vessel injury continued pain stiffness need for future surgery medical complications such as DVT or PE and anesthesia complications.  All questions were answered she will schedule her convenience

## 2024-08-23 NOTE — PHYSICAL EXAM
[de-identified] : Right hip exam   Skin: Clean/dry and intact Inspection: No obvious deformity, no swelling, no ecchymosis.  Tenderness: no tenderness over greater trochanter/glut medius insertion. No tenderness pubic symphysis, pubic tubercle, hip flexors. No ttp ischial tuberosity or buttock. No ttp over the ASIS/Illiac crest.  ROM: 0-120. Internal rotation 30 external rotation 70 Painful ROM: None  Additional tests: No pain with circumduction pos impingement test at 90 mildly positive impingement test at 60 negative Yari negative [facility] Strength: 5/5 hip flexion/ADD/ABD/Q/H/TA/GS/EHL  Neuro: Sensation in tact to light touch throughout in dp/sp/tib/neri/saph distributions  Pulses: 2+ DP/PT pulses  [de-identified] : MRI reviewed.  Decrease femoral head neck offset anterior superior labral tear

## 2024-08-23 NOTE — HISTORY OF PRESENT ILLNESS
Subjective:       Patient ID: Dewey Cagle is a 8 y.o. male.    Chief Complaint: Flat Foot; Foot Pain (feet and leg - when walking distance); Other (PCP:  Dr Trujillo  5/8/17); and Foot Problem (odor)    HPI  8 y/o male presents to the clinic with mother reporting ocassional foot and leg pain when standing/walking long periods.  No reported abnormaities during pregnancy or prior to this. He states sometimes feet feel tired.  No prior treatment.    Review of Systems  ROS:  Constitution: Negative for chills, fever, weakness and malaise/fatigue.   HEENT: Negative for headaches.   Cardiovascular: Negative for chest pain and claudication.   Respiratory: Negative for cough and shortness of breath.   Musculoskeletal: Positive for foot pain.  Negative for muscle cramps and muscle weakness.   Gastrointestinal: Negative for nausea and vomiting.   Neurological: Negative for numbness and paresthesias.   Dermatological: Negative for wound.        Objective:      Physical Exam  Constitutional:  Patient is oriented to person, place, and time. Vital signs are normal.  Appears well-developed and well-nourished.     Vascular:  Dorsalis pedis pulses are 2+ on the right side, and 2+ on the left side.   Posterior tibial pulses are 2+ on the right side, and 2+ on the left side.   + digital hair growth, capillary fill time to all toes <3 seconds, no swelling    Skin/Dermatological:  Skin is warm and intact.  No cyanosis or clubbing.  No rashes noted.  No open wounds.     Musculoskeletal:      He is moderately pronated on midstance, fully flexible, mild heel valgus on stance.  No tenderness. No ankle joint restriction.       Neurological:  No deficits to sharp/dull, light touch or vibratory sensation.   Muscle strength to tibialis anterior, extensor hallucis longus, extensor digitorum longus, peroneal muscles, flexor hallucis/digotorum longus, posterior tibial and gastrosoleal complex is 5/5, normal tone without assymmetry   Patellar  [de-identified] : 49 y.o F presents to the office complaining of 2 years of R sided worsening hip pain. Denies traumatic CYNTHIA. States she felt a pop in her hip while running a 5k about 2 years ago. Has been dealing with worsening pain ever since. Denies previous history of injury to either hip. Denies mechanical symptoms. Denies numbness/tingling in involved area. Complains of pain with ambulation, extended activities. Here today to discuss treatment options for involved injury. She presents with an MRI from 2 years ago from [facility]. She has tried physical therapy for this injury. A MRI was done since last visit, here to review results today. reflexes are 2+ on the right side and 2+ on the left side.  Achilles reflexes are 2+ on the right side and 2+ on the left side.    X-ray weightbearing bilateral feet dated today:Increased kites angle on ap view and anterior displacement of cyma line, skelatal immaturity      Assessment:       1. Pes valgus    2. Foot arch pain, unspecified laterality        Plan:       Pes valgus    Foot arch pain, unspecified laterality        Spenco Sandals or Birkenstock Sandals or whatever else equivalent online  New Balance Tennis Shoes  Spenco arch supports if Alimed do not help or not tolerated.  Never go barefoot.  Check out [employer] or [place] for quality tennis shoes.  Consider custom orthotics at skeletal maturity.

## 2024-09-06 ENCOUNTER — OUTPATIENT (OUTPATIENT)
Dept: OUTPATIENT SERVICES | Facility: HOSPITAL | Age: 49
LOS: 1 days | Discharge: HOME | End: 2024-09-06
Payer: COMMERCIAL

## 2024-09-06 VITALS
HEART RATE: 68 BPM | OXYGEN SATURATION: 100 % | RESPIRATION RATE: 17 BRPM | TEMPERATURE: 98 F | WEIGHT: 154.98 LBS | SYSTOLIC BLOOD PRESSURE: 136 MMHG | HEIGHT: 61 IN | DIASTOLIC BLOOD PRESSURE: 88 MMHG

## 2024-09-06 DIAGNOSIS — Z98.890 OTHER SPECIFIED POSTPROCEDURAL STATES: Chronic | ICD-10-CM

## 2024-09-06 DIAGNOSIS — S73.191A OTHER SPRAIN OF RIGHT HIP, INITIAL ENCOUNTER: ICD-10-CM

## 2024-09-06 DIAGNOSIS — Z01.818 ENCOUNTER FOR OTHER PREPROCEDURAL EXAMINATION: ICD-10-CM

## 2024-09-06 DIAGNOSIS — M20.10 HALLUX VALGUS (ACQUIRED), UNSPECIFIED FOOT: Chronic | ICD-10-CM

## 2024-09-06 DIAGNOSIS — Z98.89 OTHER SPECIFIED POSTPROCEDURAL STATES: Chronic | ICD-10-CM

## 2024-09-06 DIAGNOSIS — M25.851 OTHER SPECIFIED JOINT DISORDERS, RIGHT HIP: ICD-10-CM

## 2024-09-06 DIAGNOSIS — Z01.812 ENCOUNTER FOR PREPROCEDURAL LABORATORY EXAMINATION: ICD-10-CM

## 2024-09-06 LAB
ANION GAP SERPL CALC-SCNC: 8 MMOL/L — SIGNIFICANT CHANGE UP (ref 5–17)
BUN SERPL-MCNC: 15 MG/DL — SIGNIFICANT CHANGE UP (ref 7–23)
CALCIUM SERPL-MCNC: 9.3 MG/DL — SIGNIFICANT CHANGE UP (ref 8.5–10.1)
CHLORIDE SERPL-SCNC: 107 MMOL/L — SIGNIFICANT CHANGE UP (ref 96–108)
CO2 SERPL-SCNC: 26 MMOL/L — SIGNIFICANT CHANGE UP (ref 22–31)
CREAT SERPL-MCNC: 0.72 MG/DL — SIGNIFICANT CHANGE UP (ref 0.5–1.3)
EGFR: 101 ML/MIN/1.73M2 — SIGNIFICANT CHANGE UP
GLUCOSE SERPL-MCNC: 78 MG/DL — SIGNIFICANT CHANGE UP (ref 70–99)
HCG SERPL-ACNC: <1 MIU/ML — SIGNIFICANT CHANGE UP
HCT VFR BLD CALC: 41.3 % — SIGNIFICANT CHANGE UP (ref 34.5–45)
HGB BLD-MCNC: 13.9 G/DL — SIGNIFICANT CHANGE UP (ref 11.5–15.5)
MCHC RBC-ENTMCNC: 30 PG — SIGNIFICANT CHANGE UP (ref 27–34)
MCHC RBC-ENTMCNC: 33.7 G/DL — SIGNIFICANT CHANGE UP (ref 32–36)
MCV RBC AUTO: 89.2 FL — SIGNIFICANT CHANGE UP (ref 80–100)
NRBC # BLD: 0 /100 WBCS — SIGNIFICANT CHANGE UP (ref 0–0)
PLATELET # BLD AUTO: 324 K/UL — SIGNIFICANT CHANGE UP (ref 150–400)
POTASSIUM SERPL-MCNC: 4 MMOL/L — SIGNIFICANT CHANGE UP (ref 3.5–5.3)
POTASSIUM SERPL-SCNC: 4 MMOL/L — SIGNIFICANT CHANGE UP (ref 3.5–5.3)
RBC # BLD: 4.63 M/UL — SIGNIFICANT CHANGE UP (ref 3.8–5.2)
RBC # FLD: 12.3 % — SIGNIFICANT CHANGE UP (ref 10.3–14.5)
SODIUM SERPL-SCNC: 141 MMOL/L — SIGNIFICANT CHANGE UP (ref 135–145)
WBC # BLD: 4.34 K/UL — SIGNIFICANT CHANGE UP (ref 3.8–10.5)
WBC # FLD AUTO: 4.34 K/UL — SIGNIFICANT CHANGE UP (ref 3.8–10.5)

## 2024-09-06 PROCEDURE — 93010 ELECTROCARDIOGRAM REPORT: CPT

## 2024-09-06 NOTE — H&P PST ADULT - NSICDXFAMILYHX_GEN_ALL_CORE_FT
FAMILY HISTORY:  Father  Still living? No  FH: prostate cancer, Age at diagnosis: Age Unknown    Mother  Still living? Yes, Estimated age: 61-70  Family history of hypertension, Age at diagnosis: Age Unknown    Sibling  Still living? Yes, Estimated age: 41-50  Family history of diabetes mellitus (DM), Age at diagnosis: Age Unknown

## 2024-09-06 NOTE — H&P PST ADULT - GENITOURINARY COMMENTS
Second attempt to contact patient with INR results/instructions but phone went to voicemail.  Message left to contact office with callback number.    deferred

## 2024-09-06 NOTE — H&P PST ADULT - NSICDXPASTMEDICALHX_GEN_ALL_CORE_FT
PAST MEDICAL HISTORY:  COVID-19 Oct 2020    Gastric ulcer     GERD (gastroesophageal reflux disease)     Ovarian cyst left ovary

## 2024-09-06 NOTE — H&P PST ADULT - HISTORY OF PRESENT ILLNESS
48 y/o female , PMH of ulcer many yrs ago, now with heart burn, in PST with c/o of  pain in the right hip for 3 yrs. Pain getting worse over the last 6 months , on Aleve as needed.  Denies any injury. Seen by Dr Zapata had MRI, diagnosed with sprain of the right hip,  scheduled for right hip arthroscopy  with labrum repair  on 9/19/24. Pre op testing today.       50 y/o female , PMH of ulcer many yrs ago, now with heart burn, in PST with c/o of  pain in the right hip for 3 yrs. Pain getting worse over the last 6 months , on Aleve as needed.  Denies any injury. Seen by Dr Zapata had MRI, diagnosed with sprain of the right hip,  scheduled for right hip arthroscopy  with labrum repair  on 9/19/24.   Pre op testing today.

## 2024-09-06 NOTE — H&P PST ADULT - PSYCHIATRIC
Occupational Therapy Daily Treatment    Visit Count: 15/18  Plan of Care: 4/22/2019 Through: 7/15/2019  Insurance Information: [id number]  ID#: [id number]     NO AUTHORIZATION REQUIRED  NO CO-PAY  20 VISIT LIMIT PER CALENDAR YEAR  **PLEASE VERIFY WITH PATIENT AS TO HOW MANY VISITS        WERE USED THIS YEAR**  REFERENCE - COMMON GROUND ONLINE     DEDUCTIBLE - $ 3000 / $ 3000 MET  CO-INSURANCE - PAYS AT - 80 %   MAX OUT OF POCKET - $ 2600 / $ 2600 MET  Date of Surgery: 3/18/2019 Surgery Performed: right shoulder open rotator cuff repair subscapularis and supraspinatus tendon with allograft - Right    Physician Guidelines/Precautions: PROM x 8 weeks - Subscapularis Repair Protocol  lift no more than 5 pounds below chest level with the right arm.     6/24/2019: 14 weeks post op    MD F/U: 7/10/19      SUBJECTIVE     Current Pain (0-10 scale): 2/10 \"achy\" , attributed to weather  Functional Change:     OBJECTIVE   See 6/10/2019 Note for last objective measures    Treatment   There ex:  Semi-reclined at 75 and 35 °: AAROM flexion/extension x 20 with a 2# dowel  Semi-reclined at 35 °: 1# into shoulder flexion and Ts  x10  Semi-reclined at 20 °: Place / hold in various ranges of shoulder flexion with 1# weight    Seated next to mat: AAROM into flexion on mat table, inclined to 30 deg then to 45 deg x 15each ;  1# free weight x 15 @ 45 deg x 2  Internal/external rotation with her arm supported in 60 deg abduction on the mat: red band/1# x 10; green band/2# 10 x 2    Standing:   Alternating triceps kick back 2# x 10 ; biceps curl 2# x 10 - x 2 cycles  Plank at waist high mat table - alternating hand lift offs x 10 each, AAROM into lift off ~ 6\" off of table   Wall slides in scaption, with AAROM lift off and tactile at lower trap for activation - x10    Seated:  UBE Level 3 x 6 min fwd/rev      Skilled input: verbal instruction/cues, tactile instruction/cues,  inhibition    Home Program:   Access Code: [email or web address]   Supine Shoulder Flexion Extension AAROM with Dowel - 10 reps - 2 sets - 5 hold - 3x daily - 5x weekly   Scapular Retraction with Resistance - 10 reps - 2 sets - 5 hold - 3x daily - 5x weekly   Isometric Shoulder Abduction at Wall - 10 reps - 2 sets - 5 hold - 3x daily - 5x weekly   Standing Shoulder Flexion Wall Walk - 10 reps - 2 sets - 5 hold - 3x daily - 5x weekly   Plank with Shoulder Flexion on Counter - 10 reps - 2 sets - 5 hold - 3x daily - 5x weekly   Supine Scapular Protraction in Flexion with Dumbbells - 10 reps - 2 sets - 5 hold - 3x daily - 5x weekly   Seated Shoulder External Rotation in Abduction Supported with Dumbbell - 10 reps - 2 sets - 5 hold - 2x daily - 5x weekly   Scapular Retraction with Resistance Advanced - 10 reps - 2 sets - 5 hold - 3x daily - 5x weekly   Shoulder Internal Rotation in Abduction with Resistance - 10 reps - 2 sets - 5 hold - 3x daily - 5x weekly   Standing Shoulder Horizontal Abduction with Resistance - 10 reps - 2 sets - 5 hold - 3x daily - 5x weekly       Writer verbally educated the patient and received verbal consent from the patient on hand placement, positioning of patient, and techniques to be performed today including hand placement and palpation for techniques as described above and how they are pertinent to the patient's plan of care.       Suggestions for next session as indicated: progress per plan of care,  Graded AAROM, scapular retraction, graded strengthening to parascapular musculature and RC as appropriate      ASSESSMENT   Cheri's session focused continued graded strengthening in semi-reclined. Attempted shoulder AROM at 45 ° flexion - difficulty with shoulder AROM without compensation. Unable to actively flex right shoulder off of mat table, required AAROM. Wall slides in scaption with manual AAROM and tactile cue to depress and abduct shoulder blade secondary to early elevation of right upper trap.  Patient to benefit from continued tactile cueing to improve scapular mechanics and upper to lower trap ratio as patient's shoulder mechanics have compensated for a prolonged period of time. Will continue to focus on shoulder ROM and strengthening in semi-reclined.    Patient remains limited by pain, limited motion and weakness affecting their daily participation with ADLs and Instrumental ADLs. Patient will continue to benefit from skilled Occupational Therapy to optimize active range of motion, strength, and functional use, decrease pain, increase scar tissue mobility, decrease swelling/edema, and improve muscle coordination to return to independence with ADL and Instrumental ADL.    Pain after treatment (patient reported, 0-10 scale): 0  Result of above outlined education: Verbalizes understanding and Demonstrates understanding    THERAPY DAILY BILLING   Insurance: [employer] [facility] [employer] CO[employer]ATIVE EXCHANGE 2. N/A    Evaluation Procedures:  No evaluation codes were used on this date of service    Timed Procedures:  Therapeutic Exercise, 45 minutes    Untimed Procedures:  No untimed codes were used on this date of service    Total Treatment Time: 45 minutes   negative normal/normal affect/alert and oriented x3/normal behavior

## 2024-09-06 NOTE — H&P PST ADULT - PROBLEM SELECTOR PLAN 1
Right hip arthroscopy, labral repair, femoroplasty  with image.  Pre op instructions:   Hold OTC supplements.    Medical eval needed  May take Tylenol for pain or headache if needed.    NPO after 11pm to the morning of surgery.   Antibacterial wash instructions given for the morning of surgery  Patient verbalized understanding.

## 2024-09-06 NOTE — H&P PST ADULT - RESPIRATORY
normal/clear to auscultation bilaterally/no wheezes/no rales/no rhonchi normal/clear to auscultation bilaterally/no wheezes/no rales/no rhonchi/no respiratory distress/no use of accessory muscles/no subcutaneous emphysema/airway patent/good air movement

## 2024-09-18 ENCOUNTER — TRANSCRIPTION ENCOUNTER (OUTPATIENT)
Age: 49
End: 2024-09-18

## 2024-09-19 ENCOUNTER — OUTPATIENT (OUTPATIENT)
Dept: OUTPATIENT SERVICES | Facility: HOSPITAL | Age: 49
LOS: 1 days | Discharge: HOME | End: 2024-09-19
Payer: COMMERCIAL

## 2024-09-19 ENCOUNTER — APPOINTMENT (OUTPATIENT)
Dept: ORTHOPEDIC SURGERY | Facility: HOSPITAL | Age: 49
End: 2024-09-19

## 2024-09-19 ENCOUNTER — TRANSCRIPTION ENCOUNTER (OUTPATIENT)
Age: 49
End: 2024-09-19

## 2024-09-19 VITALS
DIASTOLIC BLOOD PRESSURE: 89 MMHG | RESPIRATION RATE: 16 BRPM | WEIGHT: 154.98 LBS | OXYGEN SATURATION: 98 % | TEMPERATURE: 98 F | SYSTOLIC BLOOD PRESSURE: 131 MMHG | HEART RATE: 72 BPM | HEIGHT: 61 IN

## 2024-09-19 VITALS
SYSTOLIC BLOOD PRESSURE: 125 MMHG | RESPIRATION RATE: 14 BRPM | DIASTOLIC BLOOD PRESSURE: 87 MMHG | HEART RATE: 61 BPM | OXYGEN SATURATION: 100 %

## 2024-09-19 DIAGNOSIS — M20.10 HALLUX VALGUS (ACQUIRED), UNSPECIFIED FOOT: Chronic | ICD-10-CM

## 2024-09-19 DIAGNOSIS — Z98.890 OTHER SPECIFIED POSTPROCEDURAL STATES: Chronic | ICD-10-CM

## 2024-09-19 DIAGNOSIS — Z98.89 OTHER SPECIFIED POSTPROCEDURAL STATES: Chronic | ICD-10-CM

## 2024-09-19 LAB — HCG UR QL: NEGATIVE — SIGNIFICANT CHANGE UP

## 2024-09-19 PROCEDURE — 76000 FLUOROSCOPY <1 HR PHYS/QHP: CPT | Mod: 26

## 2024-09-19 PROCEDURE — 29916 HIP ARTHRO W/LABRAL REPAIR: CPT | Mod: RT

## 2024-09-19 PROCEDURE — 29914 HIP ARTHRO W/FEMOROPLASTY: CPT | Mod: RT

## 2024-09-19 DEVICE — SUT ANCHOR NANOTACK W/FLEX INSERTER 1.4MM: Type: IMPLANTABLE DEVICE | Site: RIGHT | Status: FUNCTIONAL

## 2024-09-19 RX ORDER — FENTANYL CITRATE 50 UG/ML
25 INJECTION INTRAMUSCULAR; INTRAVENOUS
Refills: 0 | Status: DISCONTINUED | OUTPATIENT
Start: 2024-09-19 | End: 2024-09-24

## 2024-09-19 RX ORDER — ASPIRIN 81 MG
1 TABLET, DELAYED RELEASE (ENTERIC COATED) ORAL
Qty: 30 | Refills: 0
Start: 2024-09-19 | End: 2024-10-18

## 2024-09-19 RX ORDER — ACETAMINOPHEN WITH CODEINE 300MG-30MG
1 TABLET ORAL
Qty: 28 | Refills: 0
Start: 2024-09-19 | End: 2024-10-02

## 2024-09-19 RX ORDER — FENTANYL CITRATE 50 UG/ML
50 INJECTION INTRAMUSCULAR; INTRAVENOUS
Refills: 0 | Status: DISCONTINUED | OUTPATIENT
Start: 2024-09-19 | End: 2024-09-24

## 2024-09-19 RX ORDER — OXYCODONE HYDROCHLORIDE 5 MG/1
1 TABLET ORAL
Qty: 28 | Refills: 0
Start: 2024-09-19 | End: 2024-09-25

## 2024-09-19 RX ORDER — ONDANSETRON 2 MG/ML
1 INJECTION, SOLUTION INTRAMUSCULAR; INTRAVENOUS
Qty: 28 | Refills: 0
Start: 2024-09-19 | End: 2024-09-25

## 2024-09-19 RX ORDER — ONDANSETRON 2 MG/ML
4 INJECTION, SOLUTION INTRAMUSCULAR; INTRAVENOUS ONCE
Refills: 0 | Status: DISCONTINUED | OUTPATIENT
Start: 2024-09-19 | End: 2024-09-24

## 2024-09-19 RX ORDER — SODIUM CHLORIDE 9 MG/ML
3 INJECTION INTRAMUSCULAR; INTRAVENOUS; SUBCUTANEOUS EVERY 8 HOURS
Refills: 0 | Status: DISCONTINUED | OUTPATIENT
Start: 2024-09-19 | End: 2024-09-19

## 2024-09-19 RX ORDER — NALOXONE HCL 1 MG/ML
1 VIAL (ML) INJECTION
Qty: 1 | Refills: 0
Start: 2024-09-19

## 2024-09-19 RX ORDER — ACETAMINOPHEN 325 MG/1
1 TABLET ORAL
Qty: 42 | Refills: 0
Start: 2024-09-19 | End: 2024-09-25

## 2024-09-19 RX ORDER — DOCUSATE CALCIUM 240 MG/1
1 CAPSULE ORAL
Qty: 21 | Refills: 0
Start: 2024-09-19 | End: 2024-09-25

## 2024-09-19 RX ADMIN — FENTANYL CITRATE 25 MICROGRAM(S): 50 INJECTION INTRAMUSCULAR; INTRAVENOUS at 10:47

## 2024-09-19 RX ADMIN — FENTANYL CITRATE 25 MICROGRAM(S): 50 INJECTION INTRAMUSCULAR; INTRAVENOUS at 10:23

## 2024-09-19 RX ADMIN — FENTANYL CITRATE 25 MICROGRAM(S): 50 INJECTION INTRAMUSCULAR; INTRAVENOUS at 11:03

## 2024-09-19 RX ADMIN — FENTANYL CITRATE 25 MICROGRAM(S): 50 INJECTION INTRAMUSCULAR; INTRAVENOUS at 10:51

## 2024-09-19 RX ADMIN — Medication 75 MILLILITER(S): at 10:23

## 2024-09-19 NOTE — BRIEF OPERATIVE NOTE - NSICDXBRIEFPOSTOP_GEN_ALL_CORE_FT
POST-OP DIAGNOSIS:  Femoroacetabular impingement of right hip 19-Sep-2024 10:11:29  Maverick Silva

## 2024-09-19 NOTE — ASU DISCHARGE PLAN (ADULT/PEDIATRIC) - CARE PROVIDER_API CALL
Ridge Zapata  Orthopaedic Surgery  [address], [address]  [place] [address]  Phone: [phone]  Fax: [phone]  Follow Up Time:

## 2024-09-19 NOTE — ASU DISCHARGE PLAN (ADULT/PEDIATRIC) - ASU DC SPECIAL INSTRUCTIONSFT
1. Toe-touch weight-bearing at the surgical extremity.  Use assistive devices as needed.  2. Keep dressings/splint clean, dry, and intact.  3. Take medications as prescribed in your paperwork.  4. Follow up with  __ in 10-14 days.  5. Please refer to Dr. Zapata's instruction sheet regarding post-operative care after surgery. 1. Toe-touch weight-bearing at the surgical extremity.  Use assistive devices as needed.  2. Keep dressings/splint clean, dry, and intact.  3. Take medications as prescribed in your paperwork.  4. Follow up with Dr. Zapata in 10-14 days.  5. Please refer to Dr. Zapata's instruction sheet regarding post-operative care after surgery.

## 2024-09-23 DIAGNOSIS — Z91.040 LATEX ALLERGY STATUS: ICD-10-CM

## 2024-09-23 DIAGNOSIS — M25.851 OTHER SPECIFIED JOINT DISORDERS, RIGHT HIP: ICD-10-CM

## 2024-09-23 DIAGNOSIS — K21.9 GASTRO-ESOPHAGEAL REFLUX DISEASE WITHOUT ESOPHAGITIS: ICD-10-CM

## 2024-09-23 DIAGNOSIS — Z86.16 PERSONAL HISTORY OF COVID-19: ICD-10-CM

## 2024-09-30 ENCOUNTER — NON-APPOINTMENT (OUTPATIENT)
Age: 49
End: 2024-09-30

## 2024-10-02 ENCOUNTER — APPOINTMENT (OUTPATIENT)
Dept: ORTHOPEDIC SURGERY | Facility: CLINIC | Age: 49
End: 2024-10-02
Payer: COMMERCIAL

## 2024-10-02 DIAGNOSIS — M25.851 OTHER SPECIFIED JOINT DISORDERS, RIGHT HIP: ICD-10-CM

## 2024-10-02 DIAGNOSIS — S73.191A OTHER SPRAIN OF RIGHT HIP, INITIAL ENCOUNTER: ICD-10-CM

## 2024-10-02 PROBLEM — K25.9 GASTRIC ULCER, UNSPECIFIED AS ACUTE OR CHRONIC, WITHOUT HEMORRHAGE OR PERFORATION: Chronic | Status: ACTIVE | Noted: 2024-09-06

## 2024-10-02 PROCEDURE — 99024 POSTOP FOLLOW-UP VISIT: CPT

## 2024-10-02 NOTE — HISTORY OF PRESENT ILLNESS
[de-identified] : R hip [de-identified] : 50 y/o F s/p Right hip arthroscopy, labral repair, femoroplasty, acetabuloplasty, capsular closure 09/19/2024.  Overall doing well.  Minimal to no pain.  Using crutches.  Denies numbness tingling [de-identified] : Right hip exam  Incisions well-healed no erythema  No pain with hip range of motion 0-90  no pain with logroll Able to flex and extend all toes  sensation intact throughout bcr. [de-identified] : 50 y/o F s/p Right hip arthroscopy, labral repair, femoroplasty, acetabuloplasty, capsular closure 09/19/2024 [de-identified] : We reviewed postoperative protocol and restrictions.  We reviewed intraoperative photographs.  Continue crutches and partial weightbearing physical therapy she will follow-up in 1 month

## 2024-10-31 ENCOUNTER — NON-APPOINTMENT (OUTPATIENT)
Age: 49
End: 2024-10-31

## 2024-11-01 ENCOUNTER — APPOINTMENT (OUTPATIENT)
Dept: ORTHOPEDIC SURGERY | Facility: CLINIC | Age: 49
End: 2024-11-01
Payer: COMMERCIAL

## 2024-11-01 DIAGNOSIS — S73.191A OTHER SPRAIN OF RIGHT HIP, INITIAL ENCOUNTER: ICD-10-CM

## 2024-11-01 DIAGNOSIS — M25.851 OTHER SPECIFIED JOINT DISORDERS, RIGHT HIP: ICD-10-CM

## 2024-11-01 PROCEDURE — 99024 POSTOP FOLLOW-UP VISIT: CPT

## 2024-12-03 ENCOUNTER — NON-APPOINTMENT (OUTPATIENT)
Age: 49
End: 2024-12-03

## 2024-12-04 NOTE — ASU DISCHARGE PLAN (ADULT/PEDIATRIC) - ACCOMPANIED BY
December 4, 2024     Patient: Amanda Elmore   YOB: 1977   Date of Visit: 12/4/2024       To Whom it May Concern:    Amanda Elmore was seen in my clinic on 12/4/2024 at 9:00 am.    To Whom it May Concern:    This Letter is to document that patient has completed 9 month treatment for Latent TB, from dates: July 31, 2024 to 12/4/2024.    Since the patient has had a positive TB test in the past it is not uncommon for those to stay positive for the rest of their life and is of very little importance for the diagnosis of TB for the future.    If you have any questions or concerns, please don't hesitate to call.         Sincerely,         Bela Rodriguez MD    Medical information is confidential and cannot be disclosed without the written consent of the patient or her representative.     Spouse

## 2024-12-13 ENCOUNTER — APPOINTMENT (OUTPATIENT)
Dept: ORTHOPEDIC SURGERY | Facility: CLINIC | Age: 49
End: 2024-12-13
Payer: COMMERCIAL

## 2024-12-13 VITALS — BODY MASS INDEX: 25.27 KG/M2 | WEIGHT: 148 LBS | HEIGHT: 64 IN

## 2024-12-13 DIAGNOSIS — S73.191A OTHER SPRAIN OF RIGHT HIP, INITIAL ENCOUNTER: ICD-10-CM

## 2024-12-13 DIAGNOSIS — M25.851 OTHER SPECIFIED JOINT DISORDERS, RIGHT HIP: ICD-10-CM

## 2024-12-13 PROCEDURE — 99024 POSTOP FOLLOW-UP VISIT: CPT

## 2025-02-14 ENCOUNTER — APPOINTMENT (OUTPATIENT)
Dept: ORTHOPEDIC SURGERY | Facility: CLINIC | Age: 50
End: 2025-02-14

## 2025-06-12 NOTE — H&P PST ADULT - BMI (KG/M2)
Copied from CRM #[id number]. Topic: MW Messaging - MW Patient Request  >> Jun 12, 2025 11:07 AM Diane SALGADO wrote:  Tori called    Requesting a General message,   >Sent message--DO NOT REPLY - Sent from PACT - If sent to wrong pool, reroute to P [facility] Reroute pool --    Message Type:  Prior Authorization Request   Medication Name:  dicyclomine  Message: [employer] PA department following up on PA/ please call patient with update  Preferred pharmacy verified and selected.   [facility] [address] [place] [address]  Callback#: [phone]  Can a detailed message be left? Yes - Voicemail   Caller has been advised this message will be addressed within:2-3 business days [routine]   26.3

## (undated) DEVICE — DRILL BIT PIVOT NANOTACK FLEX 1.4MM

## (undated) DEVICE — SYR LUER LOK 20CC

## (undated) DEVICE — TUBING CROSSFLOW INFLOW

## (undated) DEVICE — SHAVER BLADE STRYKER FORMULA XL 4MM TOMCAT

## (undated) DEVICE — VENODYNE/SCD SLEEVE CALF MEDIUM

## (undated) DEVICE — DRAPE 3/4 SHEET W REINFORCEMENT 56X77"

## (undated) DEVICE — WARMING BLANKET UPPER ADULT

## (undated) DEVICE — S&N HIP SPINALS

## (undated) DEVICE — TUBING CROSSFLOW OUTFLOW

## (undated) DEVICE — GLV 8 PROTEXIS (WHITE)

## (undated) DEVICE — LAP PAD 18 X 18"

## (undated) DEVICE — DRAPE TOWEL BLUE 17" X 24"

## (undated) DEVICE — SHAVER BLADE S&N INCISOR PLUS ELITE 4.5MM LONG (SLATE)

## (undated) DEVICE — DRAPE SHOWER CURTAIN ISOLATION

## (undated) DEVICE — SOL IRR LR 3000ML

## (undated) DEVICE — DRSG COMBINE 5X9"

## (undated) DEVICE — PACK KNEE ARTHROSCOPY

## (undated) DEVICE — DRAPE C ARM 41X140"

## (undated) DEVICE — DRAPE STICKY U BLUE 60 X 84"

## (undated) DEVICE — PREP CHLORAPREP HI-LITE ORANGE 26ML

## (undated) DEVICE — CANNULA FLOW PORT II 165MM

## (undated) DEVICE — SUT XBRAID SZ S NON NDL 39IN WHT/BLU

## (undated) DEVICE — STRYKER PIVOT SLINGSHOT 70 DEGREE UP

## (undated) DEVICE — GLV 7.5 PROTEXIS (WHITE)

## (undated) DEVICE — CANNULA PIVOT MEDICAL TRANSPORT LENGTH 7-8-9 8MM

## (undated) DEVICE — STRYKER PIVOT NANOPASS REACH CRESCENT

## (undated) DEVICE — NDL HYPO SAFE 18G X 1.5" (PINK)

## (undated) DEVICE — SUT NYLON 3-0 18" PS-2

## (undated) DEVICE — DRAPE SPLIT SHEET 77" X 120"

## (undated) DEVICE — BLADE STRYKER PIVOT SAMURAI FULL RADIUS

## (undated) DEVICE — BUR STRYKER DIAMOND 4MM

## (undated) DEVICE — DRSG TEGADERM 4X4.75"

## (undated) DEVICE — POSITIONER STRAP ARMBOARD VELCRO TS-30

## (undated) DEVICE — PROBE SERFAS 50-S SWEEP PLUS XL

## (undated) DEVICE — S&N ARTHROCARE WAND COBLATION SUPER MULTIVAC 50 XL

## (undated) DEVICE — S&N CAP-FIX BLADE (CURVED)

## (undated) DEVICE — PORTAL ENTRY KIT